# Patient Record
(demographics unavailable — no encounter records)

---

## 2024-12-03 NOTE — RESULTS/DATA
[TextEntry] : PROCEDURE DATE: 11/20/2024 INTERPRETATION: CLINICAL INFORMATION: COVID, congestive heart failure, COPD. Pneumonia follow-up. COMPARISON: CT chest 11/14/2024, 10/25/2024 CONTRAST/COMPLICATIONS: IV Contrast: NONE Oral Contrast: NONE Complications: None PROCEDURE: CT of the Chest was performed. Sagittal and coronal reformats were performed. FINDINGS: LUNGS AND LARGE AIRWAYS: Trachea and mainstem bronchi patent. Interval clearing of left lower lobe airway secretions/debris. With respect 11/14/2024 CT chest, there has been much improved consolidation in the right lower lobe, lingula, and left lower lobe superior segment. Residual lingular and left greater than right basilar parenchymal opacification persists. Ill-defined patchy left upper lobe ground-glass opacity (image 48, series 3) is unchanged. PLEURA: Small bilateral pleural effusions. VESSELS: Aortic calcifications. Ascending thoracic aorta measures 3.9 cm. Coronary artery calcifications. Main pulmonary artery dilation up to 4.1 cm in diameter, finding may be seen in pulmonary arterial hypertension. HEART: Cardiomegaly. Small pericardial effusion. MEDIASTINUM AND VERA: Few mildly enlarged mediastinal nodes are likely reactive. CHEST WALL AND LOWER NECK: Stable left posterior-lateral chest wall lipoma. VISUALIZED UPPER ABDOMEN: Cholelithiasis. Calcified liver granuloma. Small volume ascites. BONES: Degenerative changes. Multilevel anterior syndesmophytes. IMPRESSION: Improved lingular and bilateral lower lobe pneumonia as described above. Residual opacities persist in the lingula and left greater than right lung bases. Small bilateral pleural effusions. --- End of Report --- PARUL DIAZ DO; Resident Radiologist This document has been electronically signed. NADINE HI MD; Attending Radiologist This document has been electronically signed. Nov 20 2024 4:11PM

## 2024-12-03 NOTE — PHYSICAL EXAM
[No Acute Distress] : no acute distress [Well Nourished] : well nourished [No Deformities] : no deformities [Well Developed] : well developed [Normal Oropharynx] : normal oropharynx [III] : Mallampati Class: III [Normal Appearance] : normal appearance [No Neck Mass] : no neck mass [Normal Rate/Rhythm] : normal rate/rhythm [Normal S1, S2] : normal s1, s2 [No Murmurs] : no murmurs [No Resp Distress] : no resp distress [Clear to Auscultation Bilaterally] : clear to auscultation bilaterally [No Abnormalities] : no abnormalities [Benign] : benign [Normal Gait] : normal gait [No Clubbing] : no clubbing [No Cyanosis] : no cyanosis [FROM] : FROM [3+ Pitting] : 3+ pitting [Normal Color/ Pigmentation] : normal color/ pigmentation [No Focal Deficits] : no focal deficits [Oriented x3] : oriented x3 [Normal Affect] : normal affect

## 2024-12-03 NOTE — CONSULT LETTER
[Dear  ___] : Dear  [unfilled], [Consult Letter:] : I had the pleasure of evaluating your patient, [unfilled]. [Please see my note below.] : Please see my note below. [Consult Closing:] : Thank you very much for allowing me to participate in the care of this patient.  If you have any questions, please do not hesitate to contact me. [Sincerely,] : Sincerely, [FreeTextEntry3] : Ajit Segura MD FCCP Pulmonary/Critical Care/Sleep Medicine Department of Internal Medicine  [facility]

## 2024-12-03 NOTE — DISCUSSION/SUMMARY
[FreeTextEntry1] : 87-year-old male seen today for the above.  Patient is status post congestive heart failure as well as COVID with significant improvement.  Course complicated by poorly functional BiPAP ST which is to be repaired or replaced.

## 2024-12-03 NOTE — REASON FOR VISIT
[Follow-Up - From Hospitalization] : a follow-up visit after a recent hospitalization [COPD] : COPD [Family Member] : family member [Other: _____] : [unfilled] [TextBox_44] : Chest wall restriction

## 2025-03-05 NOTE — CONSULT LETTER
[Dear  ___] : Dear  [unfilled], [Consult Letter:] : I had the pleasure of evaluating your patient, [unfilled]. [Please see my note below.] : Please see my note below. [Consult Closing:] : Thank you very much for allowing me to participate in the care of this patient.  If you have any questions, please do not hesitate to contact me. [Sincerely,] : Sincerely, [FreeTextEntry3] : Ajit Segura MD FCCP Pulmonary/Critical Care/Sleep Medicine Department of Internal Medicine  [facility]

## 2025-03-05 NOTE — DISCUSSION/SUMMARY
205
[FreeTextEntry1] : 88-year-old male seen today for the above.  Patient is present unit is failing his requirements with significant amount of hypopneas significantly raising his apnea-hypopnea index.  In addition there may be a contributing factor of central apneas which are not evident on today's compliance study.  I am therefore requesting that his unit be changed to a BiPAP ST.

## 2025-03-05 NOTE — PHYSICAL EXAM
[No Acute Distress] : no acute distress [Well Nourished] : well nourished [No Deformities] : no deformities [Well Developed] : well developed [Normal Oropharynx] : normal oropharynx [III] : Mallampati Class: III [Normal Appearance] : normal appearance [No Neck Mass] : no neck mass [Normal Rate/Rhythm] : normal rate/rhythm [Normal S1, S2] : normal s1, s2 [No Murmurs] : no murmurs [No Resp Distress] : no resp distress [Clear to Auscultation Bilaterally] : clear to auscultation bilaterally [No Abnormalities] : no abnormalities [Benign] : benign [Normal Gait] : normal gait [No Cyanosis] : no cyanosis [FROM] : FROM [3+ Pitting] : 3+ pitting [Normal Color/ Pigmentation] : normal color/ pigmentation [No Focal Deficits] : no focal deficits [Oriented x3] : oriented x3 [Normal Affect] : normal affect [TextBox_89] : Pannus edema

## 2025-03-05 NOTE — REASON FOR VISIT
[Follow-Up] : a follow-up visit [Shortness of Breath] : shortness of breath [Family Member] : family member [TextBox_44] : Chronic hypercapnic respiratory failure, diaphragmatic dysfunction

## 2025-03-05 NOTE — HISTORY OF PRESENT ILLNESS
[TextBox_4] : 88-year-old male with a history of chronic heart failure as well as chest wall restriction and hypoventilation seen today in follow-up.  Patient was to be maintained on BiPAP ST but currently is maintained only on BiPAP of 16/8.  He has had an excellent compliance of 93% greater than 4 hours with an overall usage of 97%.  His apnea-hypopnea index unfortunately is 34.4 with a hypopnea index of 15.1.  This urine does not enable us to evaluate the frequency of central events.  Course remains complicated by underlying COPD although the patient has not had any additional episodes of cough wheeze or sputum production recently has had his diuretics increased secondary to a 15 pound weight gain secondary to fluid retention.  DME company: [employer]

## 2025-05-08 NOTE — CONSULT LETTER
[Dear  ___] : Dear  [unfilled], [Consult Letter:] : I had the pleasure of evaluating your patient, [unfilled]. [Please see my note below.] : Please see my note below. [Consult Closing:] : Thank you very much for allowing me to participate in the care of this patient.  If you have any questions, please do not hesitate to contact me. [Sincerely,] : Sincerely, [FreeTextEntry3] : Ajit Segura MD FCCP Pulmonary/Critical Care/Sleep Medicine Department of Internal Medicine  [facility]

## 2025-05-08 NOTE — REASON FOR VISIT
[Follow-Up] : a follow-up visit [Shortness of Breath] : shortness of breath [Family Member] : family member [Other: _____] : [unfilled] [TextBox_44] : Chronic hypercapnic respiratory failure, diaphragmatic dysfunction

## 2025-05-08 NOTE — HISTORY OF PRESENT ILLNESS
[TextBox_4] : 88-year-old male with a history of chronic CHF, chest wall restriction, hypoventilation seen in follow-up.  Patient was previously maintained on BiPAP ST but recently was only maintained on BiPAP at 16/8.  He has had an excellent compliance in the past but with residual apnea hypopnea index of 15.  No evidence of central events are noted.  Close is also been complicated by stage I-II chronic obstructive lung disease currently maintained on Breo.  He has been doing well but is pending repeat sleep study with adaptive servo ventilation titration [TextBox_77] : 3413 [TextBox_79] : 0640 [TextBox_89] : 3

## 2025-05-08 NOTE — DISCUSSION/SUMMARY
[FreeTextEntry1] : 88-year-old male seen today for the above.  No evidence of decompensated heart failure with COPD exacerbation.  Awaiting adaptive servo study for further recommendations and reassessment of therapy